# Patient Record
Sex: MALE | Race: BLACK OR AFRICAN AMERICAN | NOT HISPANIC OR LATINO
[De-identification: names, ages, dates, MRNs, and addresses within clinical notes are randomized per-mention and may not be internally consistent; named-entity substitution may affect disease eponyms.]

---

## 2020-07-06 PROBLEM — Z00.00 ENCOUNTER FOR PREVENTIVE HEALTH EXAMINATION: Status: ACTIVE | Noted: 2020-07-06

## 2020-07-07 ENCOUNTER — APPOINTMENT (OUTPATIENT)
Dept: UROLOGY | Facility: CLINIC | Age: 45
End: 2020-07-07
Payer: COMMERCIAL

## 2020-07-07 VITALS
BODY MASS INDEX: 27.4 KG/M2 | TEMPERATURE: 97.6 F | RESPIRATION RATE: 16 BRPM | OXYGEN SATURATION: 100 % | WEIGHT: 185 LBS | HEIGHT: 69 IN | SYSTOLIC BLOOD PRESSURE: 123 MMHG | DIASTOLIC BLOOD PRESSURE: 79 MMHG | HEART RATE: 60 BPM

## 2020-07-07 DIAGNOSIS — R35.1 NOCTURIA: ICD-10-CM

## 2020-07-07 PROCEDURE — 99205 OFFICE O/P NEW HI 60 MIN: CPT

## 2020-07-07 NOTE — HISTORY OF PRESENT ILLNESS
[FreeTextEntry1] : wishes vasectomy reversal \par \par vasectomy done 9 years ago \par had two children in the past\par no problems with getting wife pregnant\par \par new partner: 40 years old \par has three kids \par 21y, 13y and the 6 years passed away \par \par healthy wife but not been evaluated

## 2020-07-07 NOTE — ASSESSMENT
[FreeTextEntry1] : wishes vasectomy reversal \par will schedule\par This patient is here for consultation regarding vasectomy reversal.  He had vasectomy 9 years ago.\par \par Reversal of vasectomy was discussed in details.  The outcomes.  Procedure.  Alternatives was discussed as well.  Most of the man with vasectomy done less than 10 years prior to evaluation will undergo vasovasostomy and the need for vasoepididymostomy is relatively rare.\par \par Need for use of contraception after the procedure was discussed with the patient if the couple does not want to get pregnant right away.\par \par Delay appearance of sperm in the ejaculate was also discussed as it is related to chronic congestive epididymitis.\par \par I will check the lab work for this patient considering that he has not had any children for last 9 years because of vasectomy.  We will schedule him for vasectomy reversal.  His wife will be evaluated by 1 of our reproductive endocrinologist because of her age.  We especially want to be sure that she has adequate ovarian reserve and that there is no early ovarian failure.\par \par All the questions were answered.\par \par Patient will see me in few weeks for scrotal ultrasound to better evaluate his epididymis and to determine if need for vasovasostomy is likely. \par The submitted E/M billing level for this visit reflects the total time spent on the day of the visit including face-to-face time spent with the patient, non-face-to-face review of medical records and relevant information, documentation, and asynchronous communication with the patient after a visit via phone, email, or patient’s EHR portal after the visit.  \par The medical records reviewed are either scanned into the chart or reviewed with the patient using a patient’s electronic medical records portal for patients with records not available to [facility] via electronic transmission platforms from other institutions and labs. \par Time spend counseling and performing coordination of care was also included in determining the appropriate EM billing level.\par \par I have reviewed and verified information regarding the chief complaint and history recorded by the ancillary staff and/or the patient. I have independently reviewed and interpreted tests performed by other physicians and facilities as necessary. \par \par I have discussed with the patient differential diagnosis, reason for auxiliary tests if ordered, risks, benefits, alternatives, and complications of each form of therapy were discussed.\par \par \par

## 2020-07-07 NOTE — PHYSICAL EXAM
[General Appearance - Well Developed] : well developed [Normal Appearance] : normal appearance [General Appearance - Well Nourished] : well nourished [Heart Rate And Rhythm] : Heart rate and rhythm were normal [Edema] : no peripheral edema [] : no respiratory distress [Skin Color & Pigmentation] : normal skin color and pigmentation [Oriented To Time, Place, And Person] : oriented to person, place, and time [No Palpable Adenopathy] : no palpable adenopathy [Scrotum] : the scrotum was normal [Urethral Meatus] : meatus normal [Penis Abnormality] : normal circumcised penis [Testes Tenderness] : no tenderness of the testes [Testes Mass (___cm)] : there were no testicular masses [FreeTextEntry1] : vas palpable bilaterally  [Normal Station and Gait] : the gait and station were normal for the patient's age

## 2020-07-13 LAB
ALBUMIN SERPL ELPH-MCNC: 4.7 G/DL
ALP BLD-CCNC: 52 U/L
ALT SERPL-CCNC: 25 U/L
ANION GAP SERPL CALC-SCNC: 14 MMOL/L
AST SERPL-CCNC: 19 U/L
BASOPHILS # BLD AUTO: 0.02 K/UL
BASOPHILS NFR BLD AUTO: 0.4 %
BILIRUB SERPL-MCNC: 0.4 MG/DL
BILIRUB UR QL STRIP: NORMAL
BUN SERPL-MCNC: 9 MG/DL
CALCIUM SERPL-MCNC: 9.4 MG/DL
CHLORIDE SERPL-SCNC: 104 MMOL/L
CLARITY UR: CLEAR
CO2 SERPL-SCNC: 24 MMOL/L
COLLECTION METHOD: NORMAL
CREAT SERPL-MCNC: 1.04 MG/DL
EOSINOPHIL # BLD AUTO: 0.08 K/UL
EOSINOPHIL NFR BLD AUTO: 1.6 %
ESTRADIOL SERPL-MCNC: 33 PG/ML
FSH SERPL-MCNC: 6.4 IU/L
GLUCOSE SERPL-MCNC: 94 MG/DL
GLUCOSE UR-MCNC: NORMAL
HCG UR QL: 0.2 EU/DL
HCT VFR BLD CALC: 48.2 %
HGB BLD-MCNC: 16 G/DL
HGB UR QL STRIP.AUTO: NORMAL
IMM GRANULOCYTES NFR BLD AUTO: 0.4 %
KETONES UR-MCNC: NORMAL
LEUKOCYTE ESTERASE UR QL STRIP: NORMAL
LH SERPL-ACNC: 5.7 IU/L
LYMPHOCYTES # BLD AUTO: 1.88 K/UL
LYMPHOCYTES NFR BLD AUTO: 36.9 %
MAN DIFF?: NORMAL
MCHC RBC-ENTMCNC: 29.5 PG
MCHC RBC-ENTMCNC: 33.2 GM/DL
MCV RBC AUTO: 88.9 FL
MONOCYTES # BLD AUTO: 0.4 K/UL
MONOCYTES NFR BLD AUTO: 7.8 %
NEUTROPHILS # BLD AUTO: 2.7 K/UL
NEUTROPHILS NFR BLD AUTO: 52.9 %
NITRITE UR QL STRIP: NORMAL
PH UR STRIP: 7
PLATELET # BLD AUTO: 202 K/UL
POTASSIUM SERPL-SCNC: 4.3 MMOL/L
PROT SERPL-MCNC: 7.3 G/DL
PROT UR STRIP-MCNC: NORMAL
PSA SERPL-MCNC: 1.2 NG/ML
RBC # BLD: 5.42 M/UL
RBC # FLD: 13.9 %
SODIUM SERPL-SCNC: 142 MMOL/L
SP GR UR STRIP: 1.02
TESTOST BND SERPL-MCNC: 11.7 PG/ML
TESTOST SERPL-MCNC: 339.2 NG/DL
WBC # FLD AUTO: 5.1 K/UL

## 2020-07-14 ENCOUNTER — APPOINTMENT (OUTPATIENT)
Dept: UROLOGY | Facility: CLINIC | Age: 45
End: 2020-07-14
Payer: COMMERCIAL

## 2020-07-14 VITALS — TEMPERATURE: 98 F

## 2020-07-14 PROCEDURE — 93976 VASCULAR STUDY: CPT

## 2020-07-14 PROCEDURE — 99214 OFFICE O/P EST MOD 30 MIN: CPT | Mod: 25

## 2020-07-14 PROCEDURE — 76870 US EXAM SCROTUM: CPT

## 2020-07-23 ENCOUNTER — OUTPATIENT (OUTPATIENT)
Dept: OUTPATIENT SERVICES | Facility: HOSPITAL | Age: 45
LOS: 1 days | End: 2020-07-23

## 2020-07-23 VITALS
HEART RATE: 70 BPM | SYSTOLIC BLOOD PRESSURE: 116 MMHG | WEIGHT: 184.97 LBS | RESPIRATION RATE: 16 BRPM | DIASTOLIC BLOOD PRESSURE: 80 MMHG | HEIGHT: 69 IN | TEMPERATURE: 97 F | OXYGEN SATURATION: 98 %

## 2020-07-23 DIAGNOSIS — N50.89 OTHER SPECIFIED DISORDERS OF THE MALE GENITAL ORGANS: ICD-10-CM

## 2020-07-23 DIAGNOSIS — Z98.52 VASECTOMY STATUS: Chronic | ICD-10-CM

## 2020-07-23 RX ORDER — SODIUM CHLORIDE 9 MG/ML
1000 INJECTION, SOLUTION INTRAVENOUS
Refills: 0 | Status: DISCONTINUED | OUTPATIENT
Start: 2020-07-30 | End: 2020-08-14

## 2020-07-23 NOTE — H&P PST ADULT - NSANTHOSAYNRD_GEN_A_CORE
No. GT screening performed.  STOP BANG Legend: 0-2 = LOW Risk; 3-4 = INTERMEDIATE Risk; 5-8 = HIGH Risk

## 2020-07-23 NOTE — H&P PST ADULT - RS GEN PE MLT RESP DETAILS PC
no wheezes/no rhonchi/respirations non-labored/no rales/clear to auscultation bilaterally/breath sounds equal

## 2020-07-23 NOTE — H&P PST ADULT - NSICDXPASTSURGICALHX_GEN_ALL_CORE_FT
PAST SURGICAL HISTORY:  Gynecomastia- chest reduction surgery 2005    H/O vasectomy 2011    LASIK Surgery 2000

## 2020-07-23 NOTE — H&P PST ADULT - NSICDXPROBLEM_GEN_ALL_CORE_FT
PROBLEM DIAGNOSES  Problem: Other specified disorders of male genital organs  Assessment and Plan: Bilateral microscurgical vasovasostomy ,possible vasoepididymostomy ,scrotal exploration ,identification of sperm under microscope, bilateral vasogram

## 2020-07-26 DIAGNOSIS — Z01.818 ENCOUNTER FOR OTHER PREPROCEDURAL EXAMINATION: ICD-10-CM

## 2020-07-27 ENCOUNTER — APPOINTMENT (OUTPATIENT)
Dept: DISASTER EMERGENCY | Facility: CLINIC | Age: 45
End: 2020-07-27

## 2020-07-27 LAB — SARS-COV-2 N GENE NPH QL NAA+PROBE: NOT DETECTED

## 2020-07-29 ENCOUNTER — TRANSCRIPTION ENCOUNTER (OUTPATIENT)
Age: 45
End: 2020-07-29

## 2020-07-29 VITALS
TEMPERATURE: 97 F | HEART RATE: 67 BPM | HEIGHT: 69 IN | SYSTOLIC BLOOD PRESSURE: 125 MMHG | OXYGEN SATURATION: 100 % | RESPIRATION RATE: 18 BRPM | DIASTOLIC BLOOD PRESSURE: 77 MMHG | WEIGHT: 184.97 LBS

## 2020-07-30 ENCOUNTER — OUTPATIENT (OUTPATIENT)
Dept: OUTPATIENT SERVICES | Facility: HOSPITAL | Age: 45
LOS: 1 days | Discharge: HOME | End: 2020-07-30
Payer: COMMERCIAL

## 2020-07-30 ENCOUNTER — APPOINTMENT (OUTPATIENT)
Dept: UROLOGY | Facility: AMBULATORY SURGERY CENTER | Age: 45
End: 2020-07-30

## 2020-07-30 VITALS
OXYGEN SATURATION: 100 % | SYSTOLIC BLOOD PRESSURE: 126 MMHG | RESPIRATION RATE: 16 BRPM | HEART RATE: 76 BPM | TEMPERATURE: 99 F | DIASTOLIC BLOOD PRESSURE: 64 MMHG

## 2020-07-30 DIAGNOSIS — N50.89 OTHER SPECIFIED DISORDERS OF THE MALE GENITAL ORGANS: ICD-10-CM

## 2020-07-30 DIAGNOSIS — Z98.52 VASECTOMY STATUS: Chronic | ICD-10-CM

## 2020-07-30 PROCEDURE — 55400 REPAIR OF SPERM DUCT: CPT | Mod: LT

## 2020-07-30 PROCEDURE — 54865 EXPLORE EPIDIDYMIS: CPT | Mod: 59

## 2020-07-30 PROCEDURE — 55300 PREPARE SPERM DUCT X-RAY: CPT

## 2020-07-30 PROCEDURE — 54900 FUSION OF SPERMATIC DUCTS: CPT

## 2020-07-30 RX ORDER — CELECOXIB 200 MG/1
1 CAPSULE ORAL
Qty: 5 | Refills: 0
Start: 2020-07-30 | End: 2020-08-03

## 2020-07-30 RX ORDER — OXYCODONE HYDROCHLORIDE 5 MG/1
1 TABLET ORAL
Qty: 6 | Refills: 0
Start: 2020-07-30 | End: 2020-07-30

## 2020-07-30 NOTE — ASU DISCHARGE PLAN (ADULT/PEDIATRIC) - CARE PROVIDER_API CALL
Damien Esposito  UROLOGY  [address] [place], [place] [address]  Phone: [phone]  Fax: [phone]  Follow Up Time:

## 2020-07-30 NOTE — ASU DISCHARGE PLAN (ADULT/PEDIATRIC) - CALL YOUR DOCTOR IF YOU HAVE ANY OF THE FOLLOWING:
Fever greater than (need to indicate Fahrenheit or Celsius) Pain not relieved by Medications/Fever greater than (need to indicate Fahrenheit or Celsius)/Bleeding that does not stop/Inability to tolerate liquids or foods

## 2020-07-30 NOTE — ASU DISCHARGE PLAN (ADULT/PEDIATRIC) - ASU DC SPECIAL INSTRUCTIONSFT
You may take Tylenol over the counter every 6 hours for pain. The oxycodone may be taken in addition for any uncontrolled pain.     Please follow up with Dr Esposito in 4 weeks. Please call the office to schedule your appointment. You may take Tylenol over the counter every 6 hours for pain. The oxycodone may be taken in addition for any uncontrolled pain.    Please follow up with Dr Esposito in 4 weeks. Please call the office to schedule your appointment.

## 2020-07-30 NOTE — ASU DISCHARGE PLAN (ADULT/PEDIATRIC) - FOLLOW UP APPOINTMENTS
911 or go to the nearest Emergency Room [facility] ([facility]):

## 2020-08-24 ENCOUNTER — APPOINTMENT (OUTPATIENT)
Dept: UROLOGY | Facility: CLINIC | Age: 45
End: 2020-08-24
Payer: COMMERCIAL

## 2020-08-24 PROCEDURE — 99024 POSTOP FOLLOW-UP VISIT: CPT

## 2020-08-24 NOTE — HISTORY OF PRESENT ILLNESS
[FreeTextEntry1] : Her for 1 month follow up after vasectomy reversal \par no pain no complaints \par erections normal \par

## 2020-08-24 NOTE — ASSESSMENT
[FreeTextEntry1] : bring semen sample in 2 months\par three months from surgery \par they want to try to have a child soon \par

## 2020-08-24 NOTE — PHYSICAL EXAM
[Penis Abnormality] : normal circumcised penis [Urethral Meatus] : meatus normal [Scrotum] : the scrotum was normal [FreeTextEntry1] : normal healing of scars from surgery

## 2020-08-30 NOTE — ASSESSMENT
[FreeTextEntry1] : S/p vasectomy \par wishes reversal \par bilateral obstructive epididymis on US \par no clear evidence of transition zone in epididymis\par \par most likely vasovaso \par labs reviewed\par The submitted E/M billing level for this visit reflects the total time spent on the day of the visit including face-to-face time spent with the patient, non-face-to-face review of medical records and relevant information, documentation, and asynchronous communication with the patient after a visit via phone, email, or patient’s EHR portal after the visit.  \par The medical records reviewed are either scanned into the chart or reviewed with the patient using a patient’s electronic medical records portal for patients with records not available to [facility] via electronic transmission platforms from other institutions and labs. \par Time spend counseling and performing coordination of care was also included in determining the appropriate EM billing level.\par \par I have reviewed and verified information regarding the chief complaint and history recorded by the ancillary staff and/or the patient. I have independently reviewed and interpreted tests performed by other physicians and facilities as necessary. \par \par I have discussed with the patient differential diagnosis, reason for auxiliary tests if ordered, risks, benefits, alternatives, and complications of each form of therapy were discussed.\par \par spend 25 min discussing findings and labs in addition to time spend performing US

## 2020-10-26 ENCOUNTER — APPOINTMENT (OUTPATIENT)
Dept: UROLOGY | Facility: CLINIC | Age: 45
End: 2020-10-26
Payer: COMMERCIAL

## 2020-10-26 VITALS
BODY MASS INDEX: 26.66 KG/M2 | SYSTOLIC BLOOD PRESSURE: 135 MMHG | OXYGEN SATURATION: 99 % | WEIGHT: 180 LBS | DIASTOLIC BLOOD PRESSURE: 88 MMHG | TEMPERATURE: 97.2 F | HEIGHT: 69 IN | RESPIRATION RATE: 16 BRPM | HEART RATE: 72 BPM

## 2020-10-26 PROCEDURE — 99024 POSTOP FOLLOW-UP VISIT: CPT

## 2020-10-26 NOTE — ASSESSMENT
[FreeTextEntry1] : very rare sperm heads  found in semen after centrifugation \par start medrol pack \par repeat in 6 weeks\par if no sperm then consider testis biopsy

## 2020-10-26 NOTE — HISTORY OF PRESENT ILLNESS
[FreeTextEntry1] : Patient is here for the follow-up after  vasovaso and vasoepididymostomy  on July 30.  This is his first semen analysis.

## 2020-12-07 ENCOUNTER — APPOINTMENT (OUTPATIENT)
Dept: UROLOGY | Facility: CLINIC | Age: 45
End: 2020-12-07
Payer: COMMERCIAL

## 2020-12-07 VITALS — TEMPERATURE: 97.5 F

## 2020-12-07 DIAGNOSIS — Z98.52 VASECTOMY STATUS: ICD-10-CM

## 2020-12-07 PROCEDURE — 99072 ADDL SUPL MATRL&STAF TM PHE: CPT

## 2020-12-07 PROCEDURE — 99213 OFFICE O/P EST LOW 20 MIN: CPT

## 2020-12-07 RX ORDER — METHYLPREDNISOLONE 4 MG/1
4 TABLET ORAL
Qty: 1 | Refills: 0 | Status: COMPLETED | COMMUNITY
Start: 2020-10-26 | End: 2020-12-07

## 2020-12-07 NOTE — HISTORY OF PRESENT ILLNESS
[FreeTextEntry1] : GEORGES SULTANA, a 44 year old male, presented to the office for his post vasectomy follow up to check for sperm in his ejaculate.\par \par Provided semen sample.\par \par

## 2020-12-07 NOTE — ASSESSMENT
[FreeTextEntry1] : s/p vasectomy for sperm analysis.\par \par analysis shows\par \par The submitted E/M billing level for this visit reflects the total time spent on the day of the visit including face-to-face time spent with the patient, non-face-to-face review of medical records and relevant information, documentation, and asynchronous communication with the patient after a visit via phone, email, or patient’s EHR portal after the visit.  \par The medical records reviewed are either scanned into the chart or reviewed with the patient using a patient’s electronic medical records portal for patients with records not available to [facility] via electronic transmission platforms from other institutions and labs. \par Time spend counseling and performing coordination of care was also included in determining the appropriate EM billing level.\par \par I have reviewed and verified information regarding the chief complaint and history recorded by the ancillary staff and/or the patient. I have independently reviewed and interpreted tests performed by other physicians and facilities as necessary. \par \par I have discussed with the patient differential diagnosis, reason for auxiliary tests if ordered, risks, benefits, alternatives, and complications of each form of therapy were discussed.

## 2021-04-12 ENCOUNTER — APPOINTMENT (OUTPATIENT)
Dept: UROLOGY | Facility: CLINIC | Age: 46
End: 2021-04-12
Payer: COMMERCIAL

## 2021-04-12 VITALS — TEMPERATURE: 98 F

## 2021-04-12 DIAGNOSIS — N50.89 OTHER SPECIFIED DISORDERS OF THE MALE GENITAL ORGANS: ICD-10-CM

## 2021-04-12 DIAGNOSIS — N45.1 EPIDIDYMITIS: ICD-10-CM

## 2021-04-12 PROCEDURE — 99214 OFFICE O/P EST MOD 30 MIN: CPT

## 2021-04-12 PROCEDURE — 89321 SEMEN ANAL SPERM DETECTION: CPT | Mod: QW

## 2021-04-12 PROCEDURE — 99072 ADDL SUPL MATRL&STAF TM PHE: CPT

## 2021-04-12 RX ORDER — PSEUDOEPHEDRINE HYDROCHLORIDE 30 MG/1
30 TABLET, FILM COATED ORAL
Qty: 20 | Refills: 0 | Status: ACTIVE | COMMUNITY
Start: 2021-04-12 | End: 1900-01-01

## 2021-04-12 NOTE — ASSESSMENT
[FreeTextEntry1] : azoospermia - seen one head out of 20 HPF\par \par rec - check IVF costs \par refer to [facility]\par \par Sudafed before next semen analysis \par ? epididymal dysfunction \par \par bring semen sample in 6 weeks\par may need testis simple biopsy

## 2021-04-12 NOTE — HISTORY OF PRESENT ILLNESS
[FreeTextEntry1] : Here for follow up after R vasoepididymostomy and L vasovasostomy \par \par no sperm found on previous semen analysis \par \par procedure date 7/30/2020\par \par wife 41\par \par \par \par

## 2021-06-29 ENCOUNTER — APPOINTMENT (OUTPATIENT)
Dept: UROLOGY | Facility: CLINIC | Age: 46
End: 2021-06-29

## 2021-12-07 NOTE — H&P PST ADULT - NSANTHTOTALSCORECAL_ENT_A_CORE
Refill approved for 1 month.  Marion Simental needs to be seen for an appointment before further refills are given.     1

## 2023-03-16 NOTE — H&P PST ADULT - HISTORY OF PRESENT ILLNESS
87
This is a 44 y.o. male who presented to Dr Esposito requesting reversal of vasectomy . Pt evaluated , sono of testes done . Pt now for surgery .

## 2025-04-16 ENCOUNTER — APPOINTMENT (OUTPATIENT)
Age: 50
End: 2025-04-16

## 2025-04-16 PROCEDURE — 99204 OFFICE O/P NEW MOD 45 MIN: CPT

## 2025-04-28 ENCOUNTER — APPOINTMENT (OUTPATIENT)
Dept: CT IMAGING | Facility: CLINIC | Age: 50
End: 2025-04-28